# Patient Record
Sex: MALE | Race: WHITE | NOT HISPANIC OR LATINO | Employment: OTHER | URBAN - METROPOLITAN AREA
[De-identification: names, ages, dates, MRNs, and addresses within clinical notes are randomized per-mention and may not be internally consistent; named-entity substitution may affect disease eponyms.]

---

## 2021-10-25 ENCOUNTER — OFFICE VISIT (OUTPATIENT)
Dept: NEUROSURGERY | Facility: CLINIC | Age: 81
End: 2021-10-25
Payer: COMMERCIAL

## 2021-10-25 VITALS
HEIGHT: 72 IN | WEIGHT: 163 LBS | TEMPERATURE: 98.3 F | SYSTOLIC BLOOD PRESSURE: 142 MMHG | DIASTOLIC BLOOD PRESSURE: 88 MMHG | BODY MASS INDEX: 22.08 KG/M2

## 2021-10-25 DIAGNOSIS — M54.16 LUMBAR RADICULOPATHY: Primary | ICD-10-CM

## 2021-10-25 PROCEDURE — 99203 OFFICE O/P NEW LOW 30 MIN: CPT | Performed by: PHYSICIAN ASSISTANT

## 2021-10-25 RX ORDER — ATENOLOL 25 MG/1
25 TABLET ORAL DAILY
COMMUNITY

## 2021-10-25 RX ORDER — MONTELUKAST SODIUM 4 MG/1
2 TABLET, CHEWABLE ORAL 2 TIMES DAILY
COMMUNITY
Start: 2020-12-02

## 2021-10-25 RX ORDER — FUROSEMIDE 20 MG
10 TABLET ORAL DAILY
COMMUNITY

## 2021-10-25 RX ORDER — TRAMADOL HYDROCHLORIDE 50 MG/1
50 TABLET ORAL EVERY 6 HOURS PRN
Qty: 10 TABLET | Refills: 0 | Status: SHIPPED | OUTPATIENT
Start: 2021-10-25 | End: 2021-10-28

## 2021-10-25 RX ORDER — FINASTERIDE 5 MG/1
5 TABLET, FILM COATED ORAL DAILY
COMMUNITY

## 2021-10-25 ASSESSMENT — MIFFLIN-ST. JEOR: SCORE: 1472.36

## 2021-10-25 ASSESSMENT — PAIN SCALES - GENERAL: PAINLEVEL: EXTREME PAIN (9)

## 2021-10-25 NOTE — PROGRESS NOTES
"Dr. Holger Condon  [facility]  Neurosurgery Clinic Visit      CC: back pain     Primary care Provider: [facility]    [facility]      Reason For Visit:   I was asked to consult on the patient for back pain.      HPI: Teofilo Buchanan is a 81 year old male who presents for evaluation of back pain he has had several months of gradually worsening low back pain, with no particular event or injury.  His pain radiates into the right groin and into the anterior aspect of his thigh.  He does occasionally get pain all the way down the leg to the foot, but not persistently.  He has been using some occasional Tylenol and ibuprofen, which does seem to help.  He did have an MRI done, but we do not [facility] just yet.  No bowel or bladder changes, or any problems with balance or coordination.  He has not had physical therapy or injections.          Past Medical History reviewed with patient during visit.    Past Surgical History reviewed with patient during visit.    Current Outpatient Medications   Medication     colestipol (COLESTID) 1 g tablet     aspirin (ASA) 81 MG EC tablet     atenolol (TENORMIN) 25 MG tablet     finasteride (PROSCAR) 5 MG tablet     furosemide (LASIX) 20 MG tablet     insulin glargine (LANTUS PEN) 100 UNIT/ML pen     No current facility-administered medications for this visit.       Allergies   Allergen Reactions     Diatrizoate Other (See Comments)     \"Hypaque\" contrast dye     Colestipol Muscle Pain (Myalgia)     Atorvastatin      MYALGIAS     Ciprofloxacin Unknown     [facility]     Penicillins Swelling     Pravastatin Muscle Pain (Myalgia)     Rosuvastatin Muscle Pain (Myalgia)     Simvastatin Muscle Pain (Myalgia)       Social History     Socioeconomic History     Marital status: Single     Spouse name: None     Number of children: None     Years of education: None     Highest education level: None   Occupational History     None "   Tobacco Use     Smoking status: Former Smoker     Smokeless tobacco: Never Used   Substance and Sexual Activity     Alcohol use: Not Currently     Drug use: Never     Sexual activity: None   Other Topics Concern     None   Social History Narrative     None     Social Determinants of Health     Financial Resource Strain:      Difficulty of Paying Living Expenses:    Food Insecurity:      Worried About Running Out of Food in the Last Year:      Ran Out of Food in the Last Year:    Transportation Needs:      Lack of Transportation (Medical):      Lack of Transportation (Non-Medical):    Physical Activity:      Days of Exercise per Week:      Minutes of Exercise per Session:    Stress:      Feeling of Stress :    Social Connections:      Frequency of Communication with Friends and Family:      Frequency of Social Gatherings with Friends and Family:      Attends Hindu Services:      Active Member of Clubs or Organizations:      Attends Club or Organization Meetings:      Marital Status:    Intimate Partner Violence:      Fear of Current or Ex-Partner:      Emotionally Abused:      Physically Abused:      Sexually Abused:        No family history on file.       ROS: 10 point ROS neg other than the symptoms noted above in the HPI.    Vital Signs: BP (!) 142/88   Temp 98.3  F (36.8  C) (Temporal)   Ht 6' (1.829 m)   Wt 163 lb (73.9 kg)   BMI 22.11 kg/m      Examination:  Constitutional:  Alert, well nourished, NAD.  HEENT: Normocephalic, atraumatic.   Pulmonary:  Without shortness of breath, normal effort.   Lymph: no lymphadenopathy to low back or LE.   Integumentary: Skin is free of rashes or lesions.   Cardiovascular:  No pitting edema of BLE.    Psych: Normal affect, no apparent distress    Neurological:  Awake  Alert  Oriented x 3  Speech clear  Cranial nerves II - XII grossly intact  Motor exam  Hip Flexor:                Right: 5/5  Left:  5/5  Hip Adductor:             Right:  5/5  Left:  5/5  Hip  Abductor:             Right:  5/5  Left:  5/5  Gastroc Soleus:        Right:  5/5  Left:  5/5  Tib/Ant:                      Right:  5/5  Left:  5/5  EHL:                          Right:  5/5  Left:  5/5       Sensation normal to bilateral upper and lower extremities.    Reflexes are 2+ in the patellar and Achilles. There is no clonus. Downgoing Babinski.      Musculoskeletal:  Gait: Able to stand from a seated position. Normal non-antalgic, non-myelopathic gait.  Able to heel/toe walk without loss of balance        Assessment/Plan:     Low back pain    Teofilo Buchanan is a 81 year old male.  We did have a discussion the patient regarding his symptoms.  He has had worsening low back and right hip pain, with no particular event or injury.  He is [facility] so that he can bring it to us for review.  In the meantime, I did give him prescription for some tramadol.  We anticipate getting him set up for an epidural injection once we have the MRI to review.  He voiced agreement and understanding.          Feroz Conway PA-C  [facility] [place] [address]    Tel [phone]  Pager [phone]      The use of Dragon/[employer] dictation services may have been used to construct the content in this note; any grammatical or spelling errors are non-intentional. Please contact the author of this note directly if you are in need of any clarification.

## 2021-10-25 NOTE — PROGRESS NOTES
Teofilo Buchanan is a 81 year old male who presents for:  No chief complaint on file.       Initial Vitals:  BP (!) 142/88   Temp 98.3  F (36.8  C) (Temporal)   Ht 6' (1.829 m)   Wt 163 lb (73.9 kg)   BMI 22.11 kg/m   Estimated body mass index is 22.11 kg/m  as calculated from the following:    Height as of this encounter: 6' (1.829 m).    Weight as of this encounter: 163 lb (73.9 kg).. Body surface area is 1.94 meters squared. BP completed using cuff size: regular  Extreme Pain (9)    Nursing Comments: Teofilo to follow up with Primary Care provider regarding elevated blood pressure.    142/88    Alondra Morrow

## 2021-10-25 NOTE — LETTER
10/25/2021         RE: Teofilo Buchanan  [address]  [place] [address]        Dear Colleague,    Thank you for referring your patient, Teofilo Buchanan, to the [facility] [place]. Please see a copy of my visit note below.    Teofilo Buchanan is a 81 year old male who presents for:  No chief complaint on file.       Initial Vitals:  BP (!) 142/88   Temp 98.3  F (36.8  C) (Temporal)   Ht 6' (1.829 m)   Wt 163 lb (73.9 kg)   BMI 22.11 kg/m   Estimated body mass index is 22.11 kg/m  as calculated from the following:    Height as of this encounter: 6' (1.829 m).    Weight as of this encounter: 163 lb (73.9 kg).. Body surface area is 1.94 meters squared. BP completed using cuff size: regular  Extreme Pain (9)    Nursing Comments: Teofilo to follow up with Primary Care provider regarding elevated blood pressure.    142/88    Alnodra Condon  [facility]  Neurosurgery Clinic Visit      CC: back pain     Primary care Provider: [facility]    [facility]      Reason For Visit:   I was asked to consult on the patient for back pain.      HPI: Teofilo Buchanan is a 81 year old male who presents for evaluation of back pain he has had several months of gradually worsening low back pain, with no particular event or injury.  His pain radiates into the right groin and into the anterior aspect of his thigh.  He does occasionally get pain all the way down the leg to the foot, but not persistently.  He has been using some occasional Tylenol and ibuprofen, which does seem to help.  He did have an MRI done, but [facility] not [facility].  No bowel or bladder changes, or any problems with balance or coordination.  He has not had physical therapy or injections.          Past Medical History reviewed with patient during visit.    Past Surgical History reviewed with patient during visit.    Current Outpatient Medications   Medication  "    colestipol (COLESTID) 1 g tablet     aspirin (ASA) 81 MG EC tablet     atenolol (TENORMIN) 25 MG tablet     finasteride (PROSCAR) 5 MG tablet     furosemide (LASIX) 20 MG tablet     insulin glargine (LANTUS PEN) 100 UNIT/ML pen     No current facility-administered medications for this visit.       Allergies   Allergen Reactions     Diatrizoate Other (See Comments)     \"Hypaque\" contrast dye     Colestipol Muscle Pain (Myalgia)     Atorvastatin      MYALGIAS     Ciprofloxacin Unknown     [facility]     Penicillins Swelling     Pravastatin Muscle Pain (Myalgia)     Rosuvastatin Muscle Pain (Myalgia)     Simvastatin Muscle Pain (Myalgia)       Social History     Socioeconomic History     Marital status: Single     Spouse name: None     Number of children: None     Years of education: None     Highest education level: None   Occupational History     None   Tobacco Use     Smoking status: Former Smoker     Smokeless tobacco: Never Used   Substance and Sexual Activity     Alcohol use: Not Currently     Drug use: Never     Sexual activity: None   Other Topics Concern     None   Social History Narrative     None     Social Determinants of Health     Financial Resource Strain:      Difficulty of Paying Living Expenses:    Food Insecurity:      Worried About Running Out of Food in the Last Year:      Ran Out of Food in the Last Year:    Transportation Needs:      Lack of Transportation (Medical):      Lack of Transportation (Non-Medical):    Physical Activity:      Days of Exercise per Week:      Minutes of Exercise per Session:    Stress:      Feeling of Stress :    Social Connections:      Frequency of Communication with Friends and Family:      Frequency of Social Gatherings with Friends and Family:      Attends Christian Services:      Active Member of Clubs or Organizations:      Attends Club or Organization Meetings:      Marital Status:    Intimate Partner Violence:      Fear of Current or Ex-Partner:      " Emotionally Abused:      Physically Abused:      Sexually Abused:        No family history on file.       ROS: 10 point ROS neg other than the symptoms noted above in the HPI.    Vital Signs: BP (!) 142/88   Temp 98.3  F (36.8  C) (Temporal)   Ht 6' (1.829 m)   Wt 163 lb (73.9 kg)   BMI 22.11 kg/m      Examination:  Constitutional:  Alert, well nourished, NAD.  HEENT: Normocephalic, atraumatic.   Pulmonary:  Without shortness of breath, normal effort.   Lymph: no lymphadenopathy to low back or LE.   Integumentary: Skin is free of rashes or lesions.   Cardiovascular:  No pitting edema of BLE.    Psych: Normal affect, no apparent distress    Neurological:  Awake  Alert  Oriented x 3  Speech clear  Cranial nerves II - XII grossly intact  Motor exam  Hip Flexor:                Right: 5/5  Left:  5/5  Hip Adductor:             Right:  5/5  Left:  5/5  Hip Abductor:             Right:  5/5  Left:  5/5  Gastroc Soleus:        Right:  5/5  Left:  5/5  Tib/Ant:                      Right:  5/5  Left:  5/5  EHL:                          Right:  5/5  Left:  5/5       Sensation normal to bilateral upper and lower extremities.    Reflexes are 2+ in the patellar and Achilles. There is no clonus. Downgoing Babinski.      Musculoskeletal:  Gait: Able to stand from a seated position. Normal non-antalgic, non-myelopathic gait.  Able to heel/toe walk without loss of balance        Assessment/Plan:     Low back pain    Teofilo Buchanan is a 81 year old male.  We did have a discussion the patient regarding his symptoms.  He has had worsening low back and right hip pain, with no particular event or injury.  [facility] is [facility] that he can bring it to us for review.  In the meantime, I did give him prescription for some tramadol.  We anticipate getting him set up for an epidural injection once we have the MRI to review.  He voiced agreement and understanding.          Feroz GARCES [facility]  Neurosurgery  [facility]  [address]  [address]  [place] [address]    Tel [phone]  Pager [phone]      The use of Dragon/[employer] dictation services may have been used to construct the content in this note; any grammatical or spelling errors are non-intentional. Please contact the author of this note directly if you are in need of any clarification.        Again, thank you for allowing me to participate in the care of your patient.        Sincerely,        Feroz Conway PA-C

## 2021-10-27 ENCOUNTER — TELEPHONE (OUTPATIENT)
Dept: NEUROSURGERY | Facility: CLINIC | Age: 81
End: 2021-10-27

## 2021-10-27 DIAGNOSIS — M48.07 LUMBOSACRAL STENOSIS WITH NEUROGENIC CLAUDICATION: ICD-10-CM

## 2021-10-27 DIAGNOSIS — M48.061 SPINAL STENOSIS, LUMBAR REGION, WITHOUT NEUROGENIC CLAUDICATION: Primary | ICD-10-CM

## 2021-10-27 NOTE — TELEPHONE ENCOUNTER
Reason for call: Pt called to advise he personally hand delivered his MRI disc to [facility] today. He was told it will be loaded into his chart. Please call him back once it has been reviewed because he wants to get an injection ordered as soon as possible. Thank you!

## 2021-10-27 NOTE — TELEPHONE ENCOUNTER
Called  Clinic  at [phone]. No disc was in our Neurosurgery inbasket at the  at  location . Was transferred to the imaging dept where they confirmed they had the disc and image was loaded into pacs. Imaging dept was wondering if patient needed the disc back.     Called and updated patient that we have the disc, image was loaded. Patient would like to keep the disc. Will route message to  RN in clinic working with Neurosurgery team to grab disc from imaging dept.     Message also routed to Feroz Conway PA-C to review MRI.

## 2021-10-28 NOTE — TELEPHONE ENCOUNTER
Placed disc at  specialty clinic for patient pick-up. Report faxed for uploading.  Tamie Bentley RN on 10/28/2021 at 8:30 AM

## 2021-10-28 NOTE — TELEPHONE ENCOUNTER
Spoke with patient. Will fax order for PT to [facility] in [place] and have him f/u with Dr. Condon after some PT.     Feroz Conway PA-C

## 2021-12-03 ENCOUNTER — OFFICE VISIT (OUTPATIENT)
Dept: NEUROSURGERY | Facility: CLINIC | Age: 81
End: 2021-12-03
Payer: COMMERCIAL

## 2021-12-03 VITALS
DIASTOLIC BLOOD PRESSURE: 66 MMHG | BODY MASS INDEX: 22.08 KG/M2 | TEMPERATURE: 98.4 F | HEIGHT: 72 IN | SYSTOLIC BLOOD PRESSURE: 136 MMHG | WEIGHT: 163 LBS

## 2021-12-03 DIAGNOSIS — M48.07 LUMBOSACRAL STENOSIS WITH NEUROGENIC CLAUDICATION: Primary | ICD-10-CM

## 2021-12-03 PROCEDURE — 99213 OFFICE O/P EST LOW 20 MIN: CPT | Performed by: PHYSICIAN ASSISTANT

## 2021-12-03 ASSESSMENT — PAIN SCALES - GENERAL: PAINLEVEL: MODERATE PAIN (4)

## 2021-12-03 ASSESSMENT — MIFFLIN-ST. JEOR: SCORE: 1472.36

## 2021-12-03 NOTE — PROGRESS NOTES
Neurosurgery Clinic  Neurosurgery followup:    HPI: Teofilo is following up today in regards to his lumbar stenosis.  He had previously been seen by myself in October, when we discussed his several months of back pain.  We did arrange for him to get some physical therapy started.  He has been doing some pool therapy as well.  This has been very helpful for him.  His radicular type right leg pain has improved significantly.  He states that he did have an epidural injection about 20 years ago, with good symptomatic improvement.      Exam:  Constitutional:  Alert, well nourished, NAD.  HEENT: Normocephalic, atraumatic.   Pulm:  Without shortness of breath   CV:  No pitting edema of BLE.      Neurological:  Awake  Alert  Oriented x 3  Motor exam:        IP Q DF PF EHL  R   5  5   5   5    5  L   5  5   5   5    5     Reflexes are 2+ in the patellar and Achilles. There is no clonus. Downgoing Babinski.      Able to spontaneously move L/E bilaterally  Sensation intact throughout all L/E dermatomes      Imaging: We again reviewed his MRI.  He has severe central stenosis at L2-3, L3-4, and L5-S1.    A/P:    We did have a discussion regarding his severe central stenosis from L2-L5.  He is actually improving with his physical therapy, including aqua therapy.  We discussed potential benefits of an epidural injection, which she has done well with in the past, although I would anticipate his degree of stenosis was substantially less 20 years ago.  He would like to get the epidural ordered so that it can get scheduled.  He was also offered follow-up with Dr. Condon if symptoms do not continue to improve.  He voiced agreement and understanding.        Feroz Conway PA-C  [facility]  [address] [address]    Tel [phone]  Pager [phone]      The use of Dragon/MD-ITation services may have been used to construct the content in this note; any  grammatical or spelling errors are non-intentional. Please contact the author of this note directly if you are in need of any clarification.

## 2021-12-03 NOTE — PROGRESS NOTES
Teofilo Buchanan is a 81 year old male who presents for:  Chief Complaint   Patient presents with     Neurologic Problem        Initial Vitals:  /66   Temp 98.4  F (36.9  C) (Temporal)   Ht 6' (1.829 m)   Wt 163 lb (73.9 kg)   BMI 22.11 kg/m   Estimated body mass index is 22.11 kg/m  as calculated from the following:    Height as of this encounter: 6' (1.829 m).    Weight as of this encounter: 163 lb (73.9 kg).. Body surface area is 1.94 meters squared. BP completed using cuff size: regular  Moderate Pain (4)    Nursing Comments:     Alondra Morrow

## 2021-12-03 NOTE — LETTER
12/3/2021         RE: Teofilo Buchanan  [address]  [place], [place] [address]        Dear Colleague,    Thank you for referring your patient, Teofilo Buchanan, to the [facility] [place]. Please see a copy of my visit note below.    Neurosurgery Clinic  Neurosurgery followup:    HPI: Teofilo is following up today in regards to his lumbar stenosis.  He had previously been seen by myself in October, when we discussed his several months of back pain.  We did arrange for him to get some physical therapy started.  He has been doing some pool therapy as well.  This has been very helpful for him.  His radicular type right leg pain has improved significantly.  He states that he did have an epidural injection about 20 years ago, with good symptomatic improvement.      Exam:  Constitutional:  Alert, well nourished, NAD.  HEENT: Normocephalic, atraumatic.   Pulm:  Without shortness of breath   CV:  No pitting edema of BLE.      Neurological:  Awake  Alert  Oriented x 3  Motor exam:        IP Q DF PF EHL  R   5  5   5   5    5  L   5  5   5   5    5     Reflexes are 2+ in the patellar and Achilles. There is no clonus. Downgoing Babinski.      Able to spontaneously move L/E bilaterally  Sensation intact throughout all L/E dermatomes      Imaging: We again reviewed his MRI.  He has severe central stenosis at L2-3, L3-4, and L5-S1.    A/P:    We did have a discussion regarding his severe central stenosis from L2-L5.  He is actually improving with his physical therapy, including aqua therapy.  We discussed potential benefits of an epidural injection, which she has done well with in the past, although I would anticipate his degree of stenosis was substantially less 20 years ago.  He would like to get the epidural ordered so that it can get scheduled.  He was also offered follow-up with Dr. Condon if symptoms do not continue to improve.  He voiced agreement and understanding.        Feroz Conway PA-C  [facility]  Neurosurgery  [address]  [address]  [place] [address]    Tel [phone]  Pager [phone]      The use of Dragon/[employer] dictation services may have been used to construct the content in this note; any grammatical or spelling errors are non-intentional. Please contact the author of this note directly if you are in need of any clarification.      Teofilo Buchanan is a 81 year old male who presents for:  Chief Complaint   Patient presents with     Neurologic Problem        Initial Vitals:  /66   Temp 98.4  F (36.9  C) (Temporal)   Ht 6' (1.829 m)   Wt 163 lb (73.9 kg)   BMI 22.11 kg/m   Estimated body mass index is 22.11 kg/m  as calculated from the following:    Height as of this encounter: 6' (1.829 m).    Weight as of this encounter: 163 lb (73.9 kg).. Body surface area is 1.94 meters squared. BP completed using cuff size: regular  Moderate Pain (4)    Nursing Comments:     Alondra Morrow        Again, thank you for allowing me to participate in the care of your patient.        Sincerely,        Feroz Conway PA-C

## 2021-12-06 ENCOUNTER — TELEPHONE (OUTPATIENT)
Dept: PALLIATIVE MEDICINE | Facility: CLINIC | Age: 81
End: 2021-12-06
Payer: COMMERCIAL

## 2021-12-06 DIAGNOSIS — Z11.59 ENCOUNTER FOR SCREENING FOR OTHER VIRAL DISEASES: ICD-10-CM

## 2021-12-06 NOTE — TELEPHONE ENCOUNTER
Pt scheduled for SHARMILA  Date: 12/23/21  Time: 1330  Dr. Meng    Instructed pt to have H&P and  for procedure.  Patient informed of COVID testing process.

## 2021-12-13 ENCOUNTER — TRANSFERRED RECORDS (OUTPATIENT)
Dept: MULTI SPECIALTY CLINIC | Facility: CLINIC | Age: 81
End: 2021-12-13
Payer: COMMERCIAL

## 2021-12-13 LAB
ALT SERPL-CCNC: 44 U/L (ref 0–55)
AST SERPL-CCNC: 36 U/L (ref 5–40)
CREATININE (EXTERNAL): 0.8 MG/DL (ref 0.5–1.5)
GFR ESTIMATED (EXTERNAL): >60 ML/MIN/1.73M^2
GLUCOSE (EXTERNAL): 122 MG/DL (ref 70–180)
POTASSIUM (EXTERNAL): 4.7 MMOL/L (ref 3.5–5)

## 2021-12-20 ENCOUNTER — LAB (OUTPATIENT)
Dept: LAB | Facility: CLINIC | Age: 81
End: 2021-12-20
Attending: ANESTHESIOLOGY
Payer: COMMERCIAL

## 2021-12-20 DIAGNOSIS — Z11.59 ENCOUNTER FOR SCREENING FOR OTHER VIRAL DISEASES: ICD-10-CM

## 2021-12-20 PROCEDURE — U0005 INFEC AGEN DETEC AMPLI PROBE: HCPCS

## 2021-12-20 PROCEDURE — U0003 INFECTIOUS AGENT DETECTION BY NUCLEIC ACID (DNA OR RNA); SEVERE ACUTE RESPIRATORY SYNDROME CORONAVIRUS 2 (SARS-COV-2) (CORONAVIRUS DISEASE [COVID-19]), AMPLIFIED PROBE TECHNIQUE, MAKING USE OF HIGH THROUGHPUT TECHNOLOGIES AS DESCRIBED BY CMS-2020-01-R: HCPCS

## 2021-12-21 LAB — SARS-COV-2 RNA RESP QL NAA+PROBE: NEGATIVE

## 2021-12-23 ENCOUNTER — HOSPITAL ENCOUNTER (OUTPATIENT)
Dept: GENERAL RADIOLOGY | Facility: CLINIC | Age: 81
End: 2021-12-23
Attending: ANESTHESIOLOGY | Admitting: ANESTHESIOLOGY
Payer: COMMERCIAL

## 2021-12-23 ENCOUNTER — ANESTHESIA EVENT (OUTPATIENT)
Dept: SURGERY | Facility: CLINIC | Age: 81
End: 2021-12-23
Payer: COMMERCIAL

## 2021-12-23 ENCOUNTER — ANESTHESIA (OUTPATIENT)
Dept: SURGERY | Facility: CLINIC | Age: 81
End: 2021-12-23
Payer: COMMERCIAL

## 2021-12-23 ENCOUNTER — HOSPITAL ENCOUNTER (OUTPATIENT)
Facility: CLINIC | Age: 81
Discharge: HOME | End: 2021-12-23
Attending: ANESTHESIOLOGY | Admitting: ANESTHESIOLOGY
Payer: COMMERCIAL

## 2021-12-23 VITALS — DIASTOLIC BLOOD PRESSURE: 86 MMHG | TEMPERATURE: 97.7 F | RESPIRATION RATE: 16 BRPM | SYSTOLIC BLOOD PRESSURE: 150 MMHG

## 2021-12-23 DIAGNOSIS — M48.07 LUMBOSACRAL STENOSIS WITH NEUROGENIC CLAUDICATION: ICD-10-CM

## 2021-12-23 LAB — GLUCOSE BLDC GLUCOMTR-MCNC: 102 MG/DL (ref 70–99)

## 2021-12-23 PROCEDURE — 250N000011 HC RX IP 250 OP 636: Performed by: ANESTHESIOLOGY

## 2021-12-23 PROCEDURE — 82962 GLUCOSE BLOOD TEST: CPT

## 2021-12-23 PROCEDURE — 370N000017 HC ANESTHESIA TECHNICAL FEE, PER MIN: Performed by: ANESTHESIOLOGY

## 2021-12-23 PROCEDURE — 64483 NJX AA&/STRD TFRM EPI L/S 1: CPT | Mod: RT | Performed by: ANESTHESIOLOGY

## 2021-12-23 PROCEDURE — 250N000009 HC RX 250: Performed by: NURSE ANESTHETIST, CERTIFIED REGISTERED

## 2021-12-23 PROCEDURE — 999N000179 XR SURGERY CARM FLUORO LESS THAN 5 MIN W STILLS: Mod: TC

## 2021-12-23 PROCEDURE — 62323 NJX INTERLAMINAR LMBR/SAC: CPT | Performed by: ANESTHESIOLOGY

## 2021-12-23 PROCEDURE — 250N000011 HC RX IP 250 OP 636: Performed by: NURSE ANESTHETIST, CERTIFIED REGISTERED

## 2021-12-23 RX ORDER — LIDOCAINE 40 MG/G
CREAM TOPICAL
Status: DISCONTINUED | OUTPATIENT
Start: 2021-12-23 | End: 2021-12-23 | Stop reason: HOSPADM

## 2021-12-23 RX ORDER — IOPAMIDOL 612 MG/ML
INJECTION, SOLUTION INTRATHECAL PRN
Status: DISCONTINUED | OUTPATIENT
Start: 2021-12-23 | End: 2021-12-23 | Stop reason: HOSPADM

## 2021-12-23 RX ORDER — METHYLPREDNISOLONE ACETATE 40 MG/ML
INJECTION, SUSPENSION INTRA-ARTICULAR; INTRALESIONAL; INTRAMUSCULAR; SOFT TISSUE PRN
Status: DISCONTINUED | OUTPATIENT
Start: 2021-12-23 | End: 2021-12-23 | Stop reason: HOSPADM

## 2021-12-23 RX ORDER — PROPOFOL 10 MG/ML
INJECTION, EMULSION INTRAVENOUS PRN
Status: DISCONTINUED | OUTPATIENT
Start: 2021-12-23 | End: 2021-12-23

## 2021-12-23 RX ORDER — LIDOCAINE HYDROCHLORIDE 20 MG/ML
INJECTION, SOLUTION INFILTRATION; PERINEURAL PRN
Status: DISCONTINUED | OUTPATIENT
Start: 2021-12-23 | End: 2021-12-23

## 2021-12-23 RX ADMIN — LIDOCAINE HYDROCHLORIDE 60 MG: 20 INJECTION, SOLUTION INFILTRATION; PERINEURAL at 14:18

## 2021-12-23 RX ADMIN — LIDOCAINE HYDROCHLORIDE 1 ML: 10 INJECTION, SOLUTION EPIDURAL; INFILTRATION; INTRACAUDAL; PERINEURAL at 12:46

## 2021-12-23 RX ADMIN — PROPOFOL 50 MG: 10 INJECTION, EMULSION INTRAVENOUS at 14:18

## 2021-12-23 RX ADMIN — PROPOFOL 20 MG: 10 INJECTION, EMULSION INTRAVENOUS at 14:21

## 2021-12-23 ASSESSMENT — COPD QUESTIONNAIRES
CAT_SEVERITY: MILD
COPD: 1

## 2021-12-23 NOTE — DISCHARGE INSTRUCTIONS

## 2021-12-23 NOTE — OP NOTE
PRIMARY PROBLEM: Low back pain and right leg pain    PROCEDURE: Right L4-5  Transforaminal Epidural Steroid Injection with fluoroscopic guidance and contrast.     PROCEDURE DETAILS: After written informed consent was obtained from the patient, the patient was escorted to the procedure room.  The patient was placed in the prone position.  A  time out  was conducted to verify patient identity, procedure to be performed, side, site, allergies and any special requirements.  The skin over the thoracolumbar region was prepped and draped in normal sterile fashion. Fluoroscopy was used to identify the neural foramen in AP view and the skin was anesthetized with 2 mL of 1% lidocaine with bicarbonate buffer. A 22-gauge Quincke spinal needle was advanced through this location and advanced under fluoroscopic guidance towards the neural foramen.  The target zone was the 6 o clock position of the pedicle.   Prior to entering the foramen, the depth of the needle was gauged with a lateral view on fluoroscopy. While still in a lateral view, the needle was slowly advanced to avoid injury to the spinal nerve.  Then, in the oblique view (approximately 28 degrees), after negative aspiration, 1.5 mL of Omnipaque contrast dye was injected revealing epidural spread without evidence of intravascular or intrathecal spread.  However I did not have a lot of central epidural spread and my intention was to get spread from L2 down to L5.  Therefore a separate 17-gauge Touhy needle was placed into the epidural interspace with loss resistance with saline and an epidurogram was confirmed with this Touhy needle showing epidural spread with no evidence of any intrathecal, intraneural, intravascular injection.  Then a 3cc solution of 20 mg of Triamcinolone in 2.5 mL of  Preservative-Free saline was slowly injected into the epidural space at each segment.  I did good spread of medication covering L2 down to L5 as well as the right L4 nerve root.  After  injection of the medication, as the needle tip was withdrawn, it was flushed with local anesthetic.   The patient was monitored with blood pressure and pulse oximetry machines with the assistance of an RN throughout the procedure.  The patient was alert and responsive to questions throughout the procedure.   The patient tolerated the procedure well and was observed in the post-procedural area.  The patient was dismissed without apparent complications.     BLOOD LOSS: < 5 cc    DIAGNOSIS:  1.  Right L4 radiculopathy causing right leg pain  2.  Severe spinal stenosis from L2-L5    PLAN:  1. Performed a right L4-5  transforaminal epidural steroid injection.  2. The patient was instructed to follow-up per Dr. Meng's instructions.  He can certainly use these injections periodically to manage his back pain.  Medicare is saying that patients can have more than 3 epidural injections per year.  I think that is reasonable if he is getting 3 or 4 months of pain relief from the injection.    Vazquez Meng MD  Diplomate of the American Board of Anesthesiology, Pain Medicine

## 2021-12-23 NOTE — ANESTHESIA POSTPROCEDURE EVALUATION
Patient: Teofilo Buchanan    Procedure: Procedure(s):  Right LUMBAR 4-5 Transfroaminal EPIDURAL injection       Diagnosis:Lumbosacral stenosis with neurogenic claudication (H) [M48.07, G95.19]  Diagnosis Additional Information: No value filed.    Anesthesia Type:  MAC    Note:  Disposition: Outpatient   Postop Pain Control: Uneventful            Sign Out: Well controlled pain   PONV: No   Neuro/Psych: Uneventful            Sign Out: Acceptable/Baseline neuro status   Airway/Respiratory: Uneventful            Sign Out: Acceptable/Baseline resp. status   CV/Hemodynamics: Uneventful            Sign Out: Acceptable CV status   Other NRE: NONE   DID A NON-ROUTINE EVENT OCCUR? No    Event details/Postop Comments:  Pt was happy with anesthesia care.  No complications.  I will follow up with the pt if needed.           Last vitals:  Vitals Value Taken Time   BP     Temp     Pulse     Resp     SpO2 97 % 12/23/21 1432   Vitals shown include unvalidated device data.    Electronically Signed By: QUAN Kaur CRNA  December 23, 2021  2:33 PM

## 2021-12-23 NOTE — ANESTHESIA CARE TRANSFER NOTE
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    Anesthesia Type:   MAC     Note:    Oropharynx: oropharynx clear of all foreign objects and spontaneously breathing  Level of Consciousness: awake  Oxygen Supplementation: room air    Independent Airway: airway patency satisfactory and stable  Dentition: dentition unchanged  Vital Signs Stable: post-procedure vital signs reviewed and stable  Report to RN Given: handoff report given  Patient transferred to: Phase II    Handoff Report: Identifed the Patient, Identified the Reponsible Provider, Reviewed the pertinent medical history, Discussed the surgical course, Reviewed Intra-OP anesthesia mangement and issues during anesthesia, Set expectations for post-procedure period and Allowed opportunity for questions and acknowledgement of understanding      Vitals:  Vitals Value Taken Time   BP     Temp     Pulse     Resp     SpO2         Electronically Signed By: QUAN Kaur CRNA  December 23, 2021  2:31 PM

## 2021-12-23 NOTE — ANESTHESIA PREPROCEDURE EVALUATION
"Anesthesia Pre-Procedure Evaluation    Patient: Teofilo Buchanan   MRN: [id number] : 1940        Preoperative Diagnosis: Lumbosacral stenosis with neurogenic claudication (H) [M48.07, G95.19]    Procedure : Procedure(s):  INJECTION, SPINE, LUMBAR 4-5 or LUMBAR 5- SACRAL 1, EPIDURAL          No past medical history on file.   History reviewed. No pertinent surgical history.   Allergies   Allergen Reactions     Diatrizoate Other (See Comments)     \"Hypaque\" contrast dye     Colestipol Muscle Pain (Myalgia)     Atorvastatin      MYALGIAS     Ciprofloxacin Unknown     Source: [facility]     Penicillins Swelling     Pravastatin Muscle Pain (Myalgia)     Rosuvastatin Muscle Pain (Myalgia)     Simvastatin Muscle Pain (Myalgia)      Social History     Tobacco Use     Smoking status: Former Smoker     Smokeless tobacco: Never Used   Substance Use Topics     Alcohol use: Not Currently      Wt Readings from Last 1 Encounters:   21 73.9 kg (163 lb)        Anesthesia Evaluation   Pt has had prior anesthetic. Type: General and MAC.    No history of anesthetic complications       ROS/MED HX  ENT/Pulmonary:     (+) mild,  COPD,     Neurologic:       Cardiovascular: Comment: - Hx of iliac artery aneurysm   - Aortic valve stenosis     (+) hypertension-----    METS/Exercise Tolerance:     Hematologic:       Musculoskeletal:       GI/Hepatic: Comment: - Hx of gastric ulcer     (+) hiatal hernia,     Renal/Genitourinary:       Endo:     (+) type II DM,     Psychiatric/Substance Use:     (+) psychiatric history anxiety and depression     Infectious Disease:       Malignancy:       Other:            Physical Exam    Airway  airway exam normal      Mallampati: II   TM distance: > 3 FB   Neck ROM: full   Mouth opening: > 3 cm    Respiratory Devices and Support         Dental  no notable dental history         Cardiovascular   cardiovascular exam normal          Pulmonary   pulmonary exam normal                OUTSIDE LABS:  CBC: No results " found for: WBC, HGB, HCT, PLT  BMP: No results found for: NA, POTASSIUM, CHLORIDE, CO2, BUN, CR, GLC  COAGS: No results found for: PTT, INR, FIBR  POC: No results found for: BGM, HCG, HCGS  HEPATIC: No results found for: ALBUMIN, PROTTOTAL, ALT, AST, GGT, ALKPHOS, BILITOTAL, BILIDIRECT, SEBASTIEN  OTHER: No results found for: PH, LACT, A1C, VICKY, PHOS, MAG, LIPASE, AMYLASE, TSH, T4, T3, CRP, SED    Anesthesia Plan    ASA Status:  3   NPO Status:  NPO Appropriate    Anesthesia Type: MAC.     - Reason for MAC: straight local not clinically adequate   Induction: Propofol, Intravenous.   Maintenance: TIVA.        Consents    Anesthesia Plan(s) and associated risks, benefits, and realistic alternatives discussed. Questions answered and patient/representative(s) expressed understanding.    - Discussed:     - Discussed with:  Patient      - Extended Intubation/Ventilatory Support Discussed: No.      - Patient is DNR/DNI Status: No    Use of blood products discussed: No .     Postoperative Care            Comments:    Other Comments: The risks and benefits of anesthesia, and the alternatives where applicable, have been discussed with the patient, and they wish to proceed.               QUAN Kaur CRNA

## (undated) DEVICE — SYR 05ML LL W/O NDL

## (undated) DEVICE — TRAY PROCEDURE SUPPORT PAIN MANAGEMENT 332114

## (undated) DEVICE — SYR 10ML LL W/O NDL

## (undated) DEVICE — PREP CHLORAPREP 26ML TINTED ORANGE  260815

## (undated) DEVICE — TUBING IV EXTENSION SET 34"

## (undated) DEVICE — GLOVE PROTEXIS W/NEU-THERA 7.5  2D73TE75